# Patient Record
Sex: FEMALE | Race: WHITE | NOT HISPANIC OR LATINO | Employment: UNEMPLOYED | URBAN - METROPOLITAN AREA
[De-identification: names, ages, dates, MRNs, and addresses within clinical notes are randomized per-mention and may not be internally consistent; named-entity substitution may affect disease eponyms.]

---

## 2019-02-01 ENCOUNTER — TELEPHONE (OUTPATIENT)
Dept: OBSTETRICS AND GYNECOLOGY | Age: 34
End: 2019-02-01

## 2019-03-22 ENCOUNTER — TRANSCRIBE ORDERS (OUTPATIENT)
Dept: ADMINISTRATIVE | Facility: HOSPITAL | Age: 34
End: 2019-03-22

## 2019-03-22 DIAGNOSIS — N64.4 BREAST PAIN, LEFT: Primary | ICD-10-CM

## 2019-03-26 ENCOUNTER — HOSPITAL ENCOUNTER (OUTPATIENT)
Dept: MAMMOGRAPHY | Facility: HOSPITAL | Age: 34
Discharge: HOME | End: 2019-03-26
Admitting: FAMILY MEDICINE

## 2019-03-26 ENCOUNTER — HOSPITAL ENCOUNTER (OUTPATIENT)
Dept: ULTRASOUND IMAGING | Facility: HOSPITAL | Age: 34
Discharge: HOME | End: 2019-03-26

## 2019-03-26 DIAGNOSIS — N64.4 BREAST PAIN, LEFT: ICD-10-CM

## 2019-03-26 PROCEDURE — 77066 DX MAMMO INCL CAD BI: CPT

## 2019-03-26 PROCEDURE — 76642 ULTRASOUND BREAST LIMITED: CPT

## 2019-04-02 ENCOUNTER — TRANSCRIBE ORDERS (OUTPATIENT)
Dept: ADMINISTRATIVE | Facility: HOSPITAL | Age: 34
End: 2019-04-02

## 2019-04-08 RX ORDER — CETIRIZINE HYDROCHLORIDE 10 MG/1
10 TABLET ORAL DAILY
COMMUNITY
End: 2021-08-20

## 2019-04-10 ENCOUNTER — HOSPITAL ENCOUNTER (OUTPATIENT)
Dept: MAMMOGRAPHY | Facility: HOSPITAL | Age: 34
Discharge: HOME | End: 2019-04-10
Admitting: FAMILY MEDICINE

## 2019-04-10 VITALS
WEIGHT: 230 LBS | OXYGEN SATURATION: 98 % | SYSTOLIC BLOOD PRESSURE: 131 MMHG | HEIGHT: 67 IN | BODY MASS INDEX: 36.1 KG/M2 | HEART RATE: 73 BPM | RESPIRATION RATE: 16 BRPM | DIASTOLIC BLOOD PRESSURE: 92 MMHG | TEMPERATURE: 96.8 F

## 2019-04-10 DIAGNOSIS — R92.8 ABNORMAL MAMMOGRAM OF RIGHT BREAST: ICD-10-CM

## 2019-04-10 PROCEDURE — 88305 TISSUE EXAM BY PATHOLOGIST: CPT | Performed by: FAMILY MEDICINE

## 2019-04-10 RX ORDER — LOSARTAN POTASSIUM 100 MG/1
100 TABLET ORAL DAILY
COMMUNITY

## 2019-04-10 RX ORDER — LIDOCAINE HYDROCHLORIDE AND EPINEPHRINE 10; 10 MG/ML; UG/ML
20 INJECTION, SOLUTION INFILTRATION; PERINEURAL ONCE
Status: COMPLETED | OUTPATIENT
Start: 2019-04-10 | End: 2019-04-10

## 2019-04-10 RX ADMIN — LIDOCAINE HYDROCHLORIDE AND EPINEPHRINE 20 ML: 10; 10 INJECTION, SOLUTION INFILTRATION; PERINEURAL at 09:30

## 2019-04-10 NOTE — NURSING NOTE
Biopsy site to right medial breast clear with Dermabond dry and intact. No firmness or swelling noted at or around biopsy site. Denies pain. Ice pack with protective covering applied to biopsy site. Discharge instructions discussed with understanding voiced by patient. Copies provided to patient. No distress noted. To home via private vehicle accompanied by her mother.

## 2019-04-11 LAB
CYTO UR: NORMAL
LAB AP CASE REPORT: NORMAL
LAB AP CLINICAL INFORMATION: NORMAL
LAB AP DIAGNOSIS COMMENT: NORMAL
PATH REPORT.FINAL DX SPEC: NORMAL
PATH REPORT.GROSS SPEC: NORMAL

## 2021-08-20 ENCOUNTER — OFFICE VISIT (OUTPATIENT)
Dept: OBSTETRICS AND GYNECOLOGY | Facility: CLINIC | Age: 36
End: 2021-08-20

## 2021-08-20 VITALS
SYSTOLIC BLOOD PRESSURE: 118 MMHG | HEIGHT: 67 IN | DIASTOLIC BLOOD PRESSURE: 62 MMHG | WEIGHT: 227.8 LBS | BODY MASS INDEX: 35.75 KG/M2

## 2021-08-20 DIAGNOSIS — Z11.3 SCREENING FOR STD (SEXUALLY TRANSMITTED DISEASE): ICD-10-CM

## 2021-08-20 DIAGNOSIS — Z01.419 ENCOUNTER FOR GYNECOLOGICAL EXAMINATION WITHOUT ABNORMAL FINDING: Primary | ICD-10-CM

## 2021-08-20 PROCEDURE — 99385 PREV VISIT NEW AGE 18-39: CPT | Performed by: OBSTETRICS & GYNECOLOGY

## 2021-08-20 RX ORDER — LEVOCETIRIZINE DIHYDROCHLORIDE 5 MG/1
5 TABLET, FILM COATED ORAL DAILY
COMMUNITY
Start: 2021-08-03

## 2021-08-20 RX ORDER — MEDROXYPROGESTERONE ACETATE 10 MG/1
10 TABLET ORAL DAILY
Qty: 10 TABLET | Refills: 2 | Status: SHIPPED | OUTPATIENT
Start: 2021-08-20 | End: 2021-08-30

## 2021-08-20 NOTE — PROGRESS NOTES
GYN Annual Exam     CC- Here for annual exam.     Malgorzata Farmer is a 35 y.o. female who presents for annual well woman exam. Periods are Very infrequent and not unusual to go 6 months without menses., lasting 5 days. Dysmenorrhea:mild, occurring first 1-2 days of flow. Cyclic symptoms include none. No intermenstrual bleeding, spotting, or discharge.  She has a long history of chronic oligo ovulation and since that time she started having periods.  She has been on birth control pills in the past but has not been sexually active in quite some time.  She did just recently develop a relationship and wishes to discuss contraceptive options as well as STD screening.    OB History        0    Para   0    Term   0       0    AB   0    Living   0       SAB   0    TAB   0    Ectopic   0    Molar   0    Multiple   0    Live Births   0                Current contraception: none  History of abnormal Pap smear: no  Family history of uterine, colon or ovarian cancer: no  History of abnormal mammogram: no  Family history of breast cancer: Maternal breast cancer  Last Pap : Several years ago    Past Medical History:   Diagnosis Date   • HTN (hypertension)    • HTN (hypertension)    • Seasonal allergies        Past Surgical History:   Procedure Laterality Date   • LAPAROSCOPIC CHOLECYSTECTOMY           Current Outpatient Medications:   •  levocetirizine (XYZAL) 5 MG tablet, Take 5 mg by mouth Daily., Disp: , Rfl:   •  losartan (COZAAR) 100 MG tablet, Take 100 mg by mouth Daily., Disp: , Rfl:   •  medroxyPROGESTERone (Provera) 10 MG tablet, Take 1 tablet by mouth Daily for 10 days., Disp: 10 tablet, Rfl: 2    No Known Allergies    Social History     Tobacco Use   • Smoking status: Never Smoker   • Smokeless tobacco: Never Used   Vaping Use   • Vaping Use: Some days   • Substances: THC   • Devices: Pre-filled pod   Substance Use Topics   • Alcohol use: Yes     Comment: occ   • Drug use: Yes     Types: Marijuana  "      Family History   Problem Relation Age of Onset   • Breast cancer Maternal Grandmother        Review of Systems   Constitutional: Negative for fatigue and fever.   Respiratory: Negative for shortness of breath.    Genitourinary: Positive for menstrual problem. Negative for pelvic pressure.       /62   Ht 170.2 cm (67\")   Wt 103 kg (227 lb 12.8 oz)   LMP 07/30/2021 Comment: Irregular cycles  BMI 35.68 kg/m²     Physical Exam  Genitourinary:      Pelvic exam was performed with patient in the lithotomy position.      Vulva, inguinal canal, urethra, bladder, vagina and uterus normal.      Bladder is not tender.      Cervical polyp present.      No cervical discharge or bleeding.            Uterus is mobile.      No uterine mass detected.     Uterus is midaxial and regular.      No right or left adnexal mass present.      Right adnexa not tender.      Left adnexa not tender.   Neck:      Thyroid: No thyroid mass or thyromegaly.   Chest:      Breasts:         Right: No mass, nipple discharge, skin change or tenderness.         Left: No mass, nipple discharge, skin change or tenderness.   Abdominal:      General: Abdomen is flat.      Palpations: Abdomen is soft. There is no mass.      Tenderness: There is no abdominal tenderness.   Neurological:      Mental Status: She is alert.   Vitals reviewed.                Assessment     1) GYN annual well woman exam.   2) chronic oligo ovulation  3) desired contraception  4) endocervical polyp     Plan     1) Breast Health - Clinical breast exam yearly, Discussed American cancer society recommendations for breast cancer screening, and Self breast awareness monthly  2) Pap -done with high risk HPV  3) Smoking status-negative  4) Encouraged to be wary of information obtained via social media and internet based on source and search.  5) Follow up prn and one year.   6) lengthy discussion with the patient regarding her long history of chronic oligo ovulation.  She has 3 " other coexisting risk factors for development of endometrial hyperplasia and potentially worse going forward.  Those would be her weight, her nulliparity, and hypertension.  Discussed the importance of regular withdrawal bleeding in some fashion that could include either oral contraceptive use, cyclic progestin use or progestin releasing IUD.  After discussion, she would like to move toward IUD insertion to provide constant progestin exposure to the endometrium as well as provide contraception.  She will return for Kyleena insertion after a progestin withdrawal bleed is given.  7) with regard to the endocervical polyp, I discussed that we could possibly try to remove it at the time of her IUD insertion but that these are usually benign and would not absolutely have to be removed if she is not having any problems.  I warned her about postcoital and clitoral related bleeding.      Sam Taylor MD   8/20/2021  11:03 EDT

## 2021-08-23 LAB
A VAGINAE DNA VAG QL NAA+PROBE: NORMAL SCORE
BVAB2 DNA VAG QL NAA+PROBE: NORMAL SCORE
C ALBICANS DNA VAG QL NAA+PROBE: NEGATIVE
C GLABRATA DNA VAG QL NAA+PROBE: NEGATIVE
C TRACH DNA VAG QL NAA+PROBE: NEGATIVE
MEGA1 DNA VAG QL NAA+PROBE: NORMAL SCORE
N GONORRHOEA DNA VAG QL NAA+PROBE: NEGATIVE
T VAGINALIS DNA VAG QL NAA+PROBE: NEGATIVE

## 2021-08-24 ENCOUNTER — TELEPHONE (OUTPATIENT)
Dept: OBSTETRICS AND GYNECOLOGY | Facility: CLINIC | Age: 36
End: 2021-08-24

## 2021-08-24 LAB
CYTOLOGIST CVX/VAG CYTO: NORMAL
CYTOLOGY CVX/VAG DOC CYTO: NORMAL
CYTOLOGY CVX/VAG DOC THIN PREP: NORMAL
DX ICD CODE: NORMAL
HIV 1 & 2 AB SER-IMP: NORMAL
HPV I/H RISK 4 DNA CVX QL PROBE+SIG AMP: NEGATIVE
OTHER STN SPEC: NORMAL
STAT OF ADQ CVX/VAG CYTO-IMP: NORMAL

## 2021-08-24 NOTE — TELEPHONE ENCOUNTER
----- Message from Sam Taylor MD sent at 8/24/2021  9:48 AM EDT -----  Cervical and vaginal panels all negative.

## 2021-08-27 NOTE — TELEPHONE ENCOUNTER
Good Morning,   Patient calling to schedule kyleena insertion, she is on her period.  From your note, it looks like we are waiting on the device.  Please follow up when it comes in.  ThanksKarla

## 2021-08-30 ENCOUNTER — TELEPHONE (OUTPATIENT)
Dept: OBSTETRICS AND GYNECOLOGY | Facility: CLINIC | Age: 36
End: 2021-08-30

## 2021-08-31 NOTE — TELEPHONE ENCOUNTER
Left voicemail for patient to return call to office. Needs to be scheduled for Kyleena insertion. I have the device.

## 2023-07-24 ENCOUNTER — OFFICE VISIT (OUTPATIENT)
Dept: FAMILY MEDICINE CLINIC | Facility: CLINIC | Age: 38
End: 2023-07-24
Payer: COMMERCIAL

## 2023-07-24 VITALS
TEMPERATURE: 98.2 F | WEIGHT: 229 LBS | DIASTOLIC BLOOD PRESSURE: 73 MMHG | OXYGEN SATURATION: 96 % | SYSTOLIC BLOOD PRESSURE: 114 MMHG | BODY MASS INDEX: 35.94 KG/M2 | HEIGHT: 67 IN | RESPIRATION RATE: 16 BRPM | HEART RATE: 103 BPM

## 2023-07-24 DIAGNOSIS — Z20.2 POSSIBLE EXPOSURE TO STD: ICD-10-CM

## 2023-07-24 DIAGNOSIS — K64.1 GRADE II HEMORRHOIDS: ICD-10-CM

## 2023-07-24 DIAGNOSIS — F41.9 ANXIETY: ICD-10-CM

## 2023-07-24 DIAGNOSIS — R73.09 ELEVATED GLUCOSE: ICD-10-CM

## 2023-07-24 DIAGNOSIS — R41.840 IMPAIRED CONCENTRATION: ICD-10-CM

## 2023-07-24 DIAGNOSIS — I10 PRIMARY HYPERTENSION: Primary | ICD-10-CM

## 2023-07-24 PROCEDURE — 99204 OFFICE O/P NEW MOD 45 MIN: CPT | Performed by: NURSE PRACTITIONER

## 2023-07-24 RX ORDER — LOSARTAN POTASSIUM 100 MG/1
100 TABLET ORAL DAILY
Qty: 90 TABLET | Refills: 1 | Status: SHIPPED | OUTPATIENT
Start: 2023-07-24 | End: 2024-01-20

## 2023-07-24 RX ORDER — HYDROCORTISONE 25 MG/G
CREAM TOPICAL 2 TIMES DAILY
Qty: 28 G | Refills: 2 | Status: SHIPPED | OUTPATIENT
Start: 2023-07-24 | End: 2023-08-21

## 2023-07-24 NOTE — PROGRESS NOTES
Chief Complaint  Establish Care, hemroid, and Hypertension    Stiven Mcgarry presents to [facility] as a 37-year-old female to establish care and to follow-up with continued medical concerns, to refill medications, obtain labs.  Overall doing well    She has a history of alternating diarrhea and constipation.  She does have some large hemorrhoids that are painful.  She does use witch hazel and hemorrhoid cream over-the-counter that does help slightly with her complaint of.  She would like a referral to colorectal for further evaluation.  She has tried to increase her fluid intake and fiber in her diet.  No rectal bleeding at this time but she does have some pink/bright red streaks occasionally when her hemorrhoids are flared    She does have a history of hypertension.  She has been stable on Cozaar 100 mg p.o. daily.  She denies any increase or changes in headaches no blurred vision no dizziness no flushing no chest pain or pressure.  She does not regularly check her blood pressure at home but has no medication side effects.    She has had a history of elevated glucose.  She is watching her carb and sugar intake  She is not fasting today will return for labs.    She is sexually active and has had multiple partners.  She would like to have a STI panel ran with her labs.  She has no rash, lesion no discharge or other cold or flulike symptoms.  She has no history of STI  She does have a Mirena she would like to have a hCG ran with her labs.  He does not usually have a menstrual period.  She does have a GYN and does plan to follow-up as scheduled    She has had some impaired concentration and anxiety in the past.  She is not currently taking any medication for either.  She is not currently in counseling.  She would like to be referred for testing for ADHD.  She has not been diagnosed in the past or had any testing previously    PHQ-2 Depression Screening  Little interest or  "pleasure in doing things? 0-->not at all   Feeling down, depressed, or hopeless? 1-->several days   PHQ-2 Total Score 1         She has no other acute complaints of today    She denies any other significant personal or family medical history    The following portions of the patient's history were reviewed and updated as appropriate: allergies, current medications, past family history, past medical history, past social history, past surgical history, and problem list                    Review of Systems   Constitutional:  Negative for chills, fatigue and fever.   HENT:  Negative for congestion.    Eyes:  Negative for visual disturbance.   Respiratory:  Negative for cough, shortness of breath and wheezing.    Cardiovascular:  Negative for chest pain, palpitations and leg swelling.   Gastrointestinal:  Positive for constipation and rectal pain. Negative for abdominal distention, abdominal pain, anal bleeding, blood in stool, diarrhea, nausea and vomiting.   Neurological:  Negative for dizziness and light-headedness.   Psychiatric/Behavioral:  Positive for decreased concentration. Negative for self-injury, sleep disturbance and suicidal ideas. The patient is not nervous/anxious.       Objective   Vital Signs:   Vitals:    07/24/23 1419   BP: 114/73   Pulse: 103   Resp: 16   Temp: 98.2 °F (36.8 °C)   TempSrc: Skin   SpO2: 96%   Weight: 104 kg (229 lb)   Height: 170.2 cm (67\")        Class 2 Severe Obesity (BMI >=35 and <=39.9). Obesity-related health conditions include the following: hypertension. Obesity is unchanged. BMI is is above average; BMI management plan is completed. We discussed portion control and increasing exercise.        Physical Exam  Vitals reviewed.   Constitutional:       General: She is not in acute distress.  Eyes:      Conjunctiva/sclera: Conjunctivae normal.   Neck:      Thyroid: No thyromegaly.      Vascular: No carotid bruit.   Cardiovascular:      Rate and Rhythm: Normal rate and regular " rhythm.      Heart sounds: Normal heart sounds.   Pulmonary:      Effort: Pulmonary effort is normal. No respiratory distress.      Breath sounds: Normal breath sounds. No stridor. No wheezing, rhonchi or rales.   Chest:      Chest wall: No tenderness.   Lymphadenopathy:      Cervical: No cervical adenopathy.   Neurological:      Mental Status: She is alert.   Psychiatric:         Attention and Perception: Attention normal.         Mood and Affect: Mood normal.        Result Review :     The following data was reviewed by: LUCA Bazan on 07/24/2023:     No recent labs to review     Assessment and Plan    Diagnoses and all orders for this visit:    1. Primary hypertension (Primary)  Comments:  Controlled continue current management  Monitor BP at home bring log to next visit  Orders:  -     Comprehensive Metabolic Panel  -     CBC & Differential  -     Lipid Panel  -     TSH  -     T4, Free  -     Vitamin D,25-Hydroxy  -     Hemoglobin A1c  -     losartan (COZAAR) 100 MG tablet; Take 1 tablet by mouth Daily for 180 days.  Dispense: 90 tablet; Refill: 1    2. Anxiety  Comments:  Controlled not on any medication at this time  Denies any SI or HI  Orders:  -     Comprehensive Metabolic Panel  -     CBC & Differential  -     Lipid Panel  -     TSH  -     T4, Free  -     Vitamin D,25-Hydroxy    3. Grade II hemorrhoids  Comments:  Worsening  Trial Anusol internal/external  Increase fluid and fiber intake  Orders:  -     Hydrocortisone, Perianal, (Anusol-HC) 2.5 % rectal cream; Insert  into the rectum 2 (Two) Times a Day for 28 days.  Dispense: 28 g; Refill: 2  -     Ambulatory Referral to Colorectal Surgery    4. Elevated glucose  Comments:  Recheck hemoglobin A1c with labs  Continue to watch carb and sugar intake  Orders:  -     Comprehensive Metabolic Panel  -     Hemoglobin A1c    5. Impaired concentration  Comments:  Referral for ADHD testing per psychiatry  Orders:  -     Comprehensive Metabolic Panel  -      CBC & Differential  -     Lipid Panel  -     TSH  -     T4, Free  -     Vitamin D,25-Hydroxy  -     Hemoglobin A1c  -     Ambulatory Referral to Psychiatry    6. Possible exposure to STD  Comments:  Labs ordered  Recommend barrier method until negative test results or all partners have been treated  Orders:  -     Hepatitis panel, acute  -     RPR  -     Chlamydia trachomatis, Neisseria gonorrhoeae, Trichomonas vaginalis, PCR - Urine, Urine, Clean Catch  -     Hepatitis C RNA, quantitative, PCR (graph)  -     HIV-1/O/2 ANTIGEN/ANTIBODY, 4TH GENERATION  -     HSV 1 and 2-Specific Ab, IgG  -     hCG, Serum, Qualitative        Follow Up   Return in about 6 months (around 1/24/2024) for Annual physical.  Patient was given instructions and counseling regarding her condition or for health maintenance advice. Please see specific information pulled into the AVS if appropriate.

## 2023-08-09 PROBLEM — E66.811 OBESITY (BMI 30.0-34.9): Status: ACTIVE | Noted: 2019-10-07

## 2023-08-09 PROBLEM — E66.9 OBESITY (BMI 30.0-34.9): Status: ACTIVE | Noted: 2019-10-07

## 2023-08-09 PROBLEM — K80.01 CHOLELITHIASIS AND ACUTE CHOLECYSTITIS WITH OBSTRUCTION: Status: ACTIVE | Noted: 2019-10-07

## 2023-09-13 NOTE — TELEPHONE ENCOUNTER
Left voicemail for patient to return call to office.     Inform all vaginal panels are negative.    Winlevi Pregnancy And Lactation Text: This medication is considered safe during pregnancy and breastfeeding.

## 2024-02-06 DIAGNOSIS — I10 PRIMARY HYPERTENSION: ICD-10-CM

## 2024-02-06 RX ORDER — LOSARTAN POTASSIUM 100 MG/1
100 TABLET ORAL DAILY
Qty: 30 TABLET | Refills: 0 | Status: SHIPPED | OUTPATIENT
Start: 2024-02-06 | End: 2024-02-06

## 2024-02-06 RX ORDER — LOSARTAN POTASSIUM 100 MG/1
100 TABLET ORAL DAILY
Qty: 90 TABLET | Refills: 0 | Status: SHIPPED | OUTPATIENT
Start: 2024-02-06

## 2024-04-10 ENCOUNTER — TELEPHONE (OUTPATIENT)
Dept: FAMILY MEDICINE CLINIC | Facility: CLINIC | Age: 39
End: 2024-04-10
Payer: COMMERCIAL

## 2024-05-06 DIAGNOSIS — I10 PRIMARY HYPERTENSION: ICD-10-CM

## 2024-05-09 RX ORDER — LOSARTAN POTASSIUM 100 MG/1
100 TABLET ORAL DAILY
Qty: 30 TABLET | Refills: 0 | Status: SHIPPED | OUTPATIENT
Start: 2024-05-09

## 2024-07-23 DIAGNOSIS — I10 PRIMARY HYPERTENSION: ICD-10-CM

## 2024-07-23 NOTE — TELEPHONE ENCOUNTER
Caller: Malgorzata Farmer    Relationship: Self    Best call back number: [phone]     What was the call regarding: PATIENT IS OUT OF THIS MEDICATION.

## 2024-07-24 ENCOUNTER — TELEPHONE (OUTPATIENT)
Dept: FAMILY MEDICINE CLINIC | Facility: CLINIC | Age: 39
End: 2024-07-24
Payer: COMMERCIAL

## 2024-07-24 RX ORDER — LOSARTAN POTASSIUM 100 MG/1
100 TABLET ORAL DAILY
Qty: 14 TABLET | Refills: 0 | Status: SHIPPED | OUTPATIENT
Start: 2024-07-24

## 2024-07-24 NOTE — TELEPHONE ENCOUNTER
Called and left vm for pt to call and make an appt. For further refills. It has been a year since her last visit.

## 2024-07-24 NOTE — TELEPHONE ENCOUNTER
Patient called back and said that she can't come in for a visit due to not having a job and no health insurance. She states she is unaware that she has to be seen every six months.    I talked with patient and let her know that she is over due.Patient wasn't happy that she has to be seen and ended the call.

## 2024-07-24 NOTE — TELEPHONE ENCOUNTER
Caller: Malgorzata Farmer    Relationship: Self    Best call back number: [phone]       What was the call regarding PATIENT HAS BEEN OUT OF THIS MEDICATION FOR A COUPLE OF DAYS AND CALLED BACK ASKING WHY IT HASN'T BEEN REFILLED.  SHE REALLY NEEDS IT.  PLEASE CALL TO DISCUSS OR SEND MEDICATION TO PHARMACY.      [id number] - [place], [place] - [address] - [phone]  - [phone] FX

## 2024-07-30 ENCOUNTER — OFFICE VISIT (OUTPATIENT)
Dept: FAMILY MEDICINE CLINIC | Facility: CLINIC | Age: 39
End: 2024-07-30
Payer: COMMERCIAL

## 2024-07-30 VITALS
DIASTOLIC BLOOD PRESSURE: 82 MMHG | BODY MASS INDEX: 35.41 KG/M2 | SYSTOLIC BLOOD PRESSURE: 110 MMHG | HEIGHT: 67 IN | OXYGEN SATURATION: 98 % | TEMPERATURE: 98.1 F | WEIGHT: 225.6 LBS | HEART RATE: 63 BPM

## 2024-07-30 DIAGNOSIS — F41.8 ANXIETY ASSOCIATED WITH DEPRESSION: ICD-10-CM

## 2024-07-30 DIAGNOSIS — I10 PRIMARY HYPERTENSION: Primary | ICD-10-CM

## 2024-07-30 PROCEDURE — 99214 OFFICE O/P EST MOD 30 MIN: CPT

## 2024-07-30 PROCEDURE — 1160F RVW MEDS BY RX/DR IN RCRD: CPT

## 2024-07-30 PROCEDURE — 1126F AMNT PAIN NOTED NONE PRSNT: CPT

## 2024-07-30 PROCEDURE — 1159F MED LIST DOCD IN RCRD: CPT

## 2024-07-30 RX ORDER — LOSARTAN POTASSIUM 100 MG/1
100 TABLET ORAL DAILY
Qty: 90 TABLET | Refills: 1 | Status: SHIPPED | OUTPATIENT
Start: 2024-07-30

## 2024-07-30 RX ORDER — ESCITALOPRAM OXALATE 10 MG/1
10 TABLET ORAL DAILY
Qty: 60 TABLET | Refills: 0 | Status: SHIPPED | OUTPATIENT
Start: 2024-07-30

## 2024-07-30 NOTE — PROGRESS NOTES
Chief Complaint  Hypertension, Med Refill (losartan), and Depression    Subjective          Hypertension  Pertinent negatives include no chest pain, palpitations or shortness of breath.   DepressionSymptoms include nervousness/anxiety. Patient is not experiencing: palpitations, shortness of breath, suicidal ideas, chest pain, dizziness and nausea.  Her past medical history is significant for depression.       Malgorzata Farmer 38 y.o. female presents for medical management. Since the last visit, she has overall felt depressed. She has Primary Hypertension and well controlled on current medication. She has been compliant with current medications, and I have reviewed them. The patient denies medication side effects. Will refill medications.     Malgorzata Farmer also presents today for new evaluation and treatment of Depression and Anxiety. She reports depressed mood, crying spells, difficulty falling asleep, anxiety, excessive worry, and unable to relax. Onset of symptoms was approximately 23  years ago. She denies current suicidal and homicidal ideation. Risk factors are history of depression and feelings of failure. Previous treatment includes medication (SSRI). She complains of the following medication side effects: none. The patient has previously been in counseling and agrees to start counseling.    PHQ-9 score: 17    After a discussion with the patient today, she would like to start Escitalopram. She is not currently pregnant. She was instructed to inform OBGYN that she is taking this medication should she become pregnant. She agrees to a close follow-up in 6 weeks.         Review of Systems   Constitutional:  Negative for chills, fatigue and fever.   HENT:  Negative for congestion and sinus pressure.    Eyes:  Negative for visual disturbance.   Respiratory:  Negative for cough, chest tightness and shortness of breath.    Cardiovascular:  Negative for chest pain and palpitations.   Gastrointestinal:  Negative for  "abdominal pain, constipation, diarrhea, nausea and vomiting.   Genitourinary:  Negative for dysuria, frequency and urgency.   Musculoskeletal:  Negative for back pain.   Skin:  Negative for rash.   Neurological:  Negative for dizziness, syncope and light-headedness.   Psychiatric/Behavioral:  Positive for dysphoric mood. Negative for behavioral problems, self-injury, sleep disturbance and suicidal ideas. The patient is nervous/anxious.         Objective   Vital Signs:   /82 (BP Location: Left arm, Patient Position: Sitting, Cuff Size: Adult)   Pulse 63   Temp 98.1 °F (36.7 °C) (Oral)   Ht 170.2 cm (67\")   Wt 102 kg (225 lb 9.6 oz)   SpO2 98%   BMI 35.33 kg/m²      Class 2 Severe Obesity (BMI >=35 and <=39.9). Obesity-related health conditions include the following: none. Obesity is newly identified. BMI is is above average; BMI management plan is completed. We discussed portion control and increasing exercise.       Physical Exam  Vitals and nursing note reviewed.   Constitutional:       Appearance: She is well-developed. She is obese. She is not toxic-appearing or diaphoretic.   HENT:      Head: Normocephalic and atraumatic.   Eyes:      General: No scleral icterus.     Conjunctiva/sclera: Conjunctivae normal.      Pupils: Pupils are equal, round, and reactive to light.   Neck:      Thyroid: No thyromegaly.      Vascular: No carotid bruit.   Cardiovascular:      Rate and Rhythm: Normal rate and regular rhythm.      Heart sounds: Normal heart sounds. No murmur heard.     No gallop.   Pulmonary:      Effort: Pulmonary effort is normal. No respiratory distress.      Breath sounds: Normal breath sounds. No stridor.   Musculoskeletal:         General: No deformity. Normal range of motion.      Cervical back: Normal range of motion and neck supple.   Skin:     General: Skin is warm and dry.      Coloration: Skin is not jaundiced.   Neurological:      General: No focal deficit present.      Mental Status: She " is alert and oriented to person, place, and time.      Motor: No abnormal muscle tone.      Coordination: Coordination normal.   Psychiatric:         Mood and Affect: Mood is depressed. Mood is not anxious. Affect is tearful.         Behavior: Behavior normal.         Thought Content: Thought content normal. Thought content does not include homicidal or suicidal ideation. Thought content does not include homicidal or suicidal plan.         Judgment: Judgment normal.                         Assessment and Plan      Assessment & Plan  Primary hypertension  Hypertension is stable and controlled  Continue current treatment regimen.  Dietary sodium restriction.  Weight loss.  Regular aerobic exercise.  Blood pressure will be reassessed in 6 months.  Anxiety associated with depression  Patient's depression is a recurrent episode that is moderate without psychosis. Depression is active and newly identified.  No SI or HI    Plan:   Begin new antidepressant medicine; Escitalopram 10 mg once daily    Follow up in 6 weeks .      New Medications Ordered This Visit   Medications    losartan (COZAAR) 100 MG tablet     Sig: Take 1 tablet by mouth Daily.     Dispense:  90 tablet     Refill:  1    escitalopram (Lexapro) 10 MG tablet     Sig: Take 1 tablet by mouth Daily.     Dispense:  60 tablet     Refill:  0              Follow Up     Return in about 6 weeks (around 9/10/2024) for Video visit.    Patient was given instructions and counseling regarding her condition or for health maintenance advice. Please see specific information pulled into the AVS if appropriate.     -Bonds for safety were provided today. The patient was encouraged to seek immediate mental health evaluation for worsening depression, suicidal thoughts, suicidal plan, or thoughts of self-harm.   -The patient was given a list of local therapists today.

## 2024-09-10 ENCOUNTER — TELEMEDICINE (OUTPATIENT)
Dept: FAMILY MEDICINE CLINIC | Facility: CLINIC | Age: 39
End: 2024-09-10
Payer: COMMERCIAL

## 2024-09-10 VITALS — WEIGHT: 225 LBS | BODY MASS INDEX: 35.31 KG/M2 | HEIGHT: 67 IN

## 2024-09-10 DIAGNOSIS — J30.2 SEASONAL ALLERGIES: ICD-10-CM

## 2024-09-10 DIAGNOSIS — F41.8 ANXIETY ASSOCIATED WITH DEPRESSION: Primary | ICD-10-CM

## 2024-09-10 DIAGNOSIS — I10 PRIMARY HYPERTENSION: ICD-10-CM

## 2024-09-10 PROCEDURE — 99213 OFFICE O/P EST LOW 20 MIN: CPT | Performed by: NURSE PRACTITIONER

## 2024-09-10 PROCEDURE — 1126F AMNT PAIN NOTED NONE PRSNT: CPT | Performed by: NURSE PRACTITIONER

## 2024-09-10 RX ORDER — LEVOCETIRIZINE DIHYDROCHLORIDE 5 MG/1
5 TABLET, FILM COATED ORAL DAILY
Qty: 90 TABLET | Refills: 3 | Status: SHIPPED | OUTPATIENT
Start: 2024-09-10

## 2024-09-10 RX ORDER — ESCITALOPRAM OXALATE 20 MG/1
20 TABLET ORAL DAILY
Qty: 90 TABLET | Refills: 1 | Status: SHIPPED | OUTPATIENT
Start: 2024-09-10

## 2024-09-10 NOTE — PROGRESS NOTES
Chief Complaint  Anxiety and Hypertension    Subjective          Mode of communication: Video   You have chosen to receive care through a telehealth visit.  Do you consent to use a video/audio connection for your medical care today? Yes  Location of patient: home  Location of provider: [facility] [address] [place] [address]  The visit included audio and video interaction.  No technical issues occurred during this visit.      Anxiety   Patient reports no chest pain, dizziness, nausea, nervous/anxious behavior, palpitations, shortness of breath or suicidal ideas.   Hypertension  Associated symptoms include anxiety. Pertinent negatives include no chest pain, headaches, palpitations or shortness of breath.      Malgorzata presents to [facility] as a 38-year-old female for video telehealth visit to follow-up on anxiety and depression and hypertension.  Overall doing well    She was seen in the office by Kathleen lebron approximately 6 weeks ago  Note from that visit  Hypertension  Pertinent negatives include no chest pain, palpitations or shortness of breath.   DepressionSymptoms include nervousness/anxiety. Patient is not experiencing: palpitations, shortness of breath, suicidal ideas, chest pain, dizziness and nausea.  Her past medical history is significant for depression.         Malgorzata Farmer 38 y.o. female presents for medical management. Since the last visit, she has overall felt depressed. She has Primary Hypertension and well controlled on current medication. She has been compliant with current medications, and I have reviewed them. The patient denies medication side effects. Will refill medications.      Malgorzata Farmer also presents today for new evaluation and treatment of Depression and Anxiety. She reports depressed mood, crying spells, difficulty falling asleep, anxiety, excessive worry, and unable to relax. Onset of symptoms was approximately 23  years ago. She denies current suicidal  and homicidal ideation. Risk factors are history of depression and feelings of failure. Previous treatment includes medication (SSRI). She complains of the following medication side effects: none. The patient has previously been in counseling and agrees to start counseling.     PHQ-9 score: 17     After a discussion with the patient today, she would like to start Escitalopram. She is not currently pregnant. She was instructed to inform OBGYN that she is taking this medication should she become pregnant. She agrees to a close follow-up in 6 weeks.         Overall she has been doing a little bit better since last visit.  She does feel that she still having some anxiety and excessive worry and feeling down occasionally but it has been improved.  She would like to try increasing her Lexapro.  She has tolerated this medication well with no side effects.  She has previously been in counseling and does plan to follow-up      She does occasionally check her blood pressure at home.  It has been stable with no changes.  No increase or changes in headaches no blurred vision no dizziness no flushing no chest pain or pressure    Seasonal allergies-takes Xyzal works well    She does need refills on her medication today    No other acute complaints      The following portions of the patient's history were reviewed and updated as appropriate: allergies, current medications, past family history, past medical history, past social history, past surgical history, and problem list    Review of Systems   Constitutional:  Negative for chills, fatigue and fever.   Eyes:  Negative for visual disturbance.   Respiratory:  Negative for cough, shortness of breath, wheezing and stridor.    Cardiovascular:  Negative for chest pain, palpitations and leg swelling.   Gastrointestinal:  Negative for abdominal pain, diarrhea, nausea and vomiting.   Skin:  Negative for rash.   Neurological:  Negative for dizziness.   Psychiatric/Behavioral:  Negative  "for self-injury, sleep disturbance and suicidal ideas. The patient is not nervous/anxious.         Objective   Vital Signs: unable to assess  No temp per pt.      B/p reports B/p normal 110-120/70s  Vitals:    09/10/24 1641   Weight: 102 kg (225 lb)   Height: 170.2 cm (67\")            7/30/2024    10:30 AM   PHQ-2/PHQ-9 Depression Screening   Little Interest or Pleasure in Doing Things 1-->several days   Feeling Down, Depressed or Hopeless 3-->nearly every day   Trouble Falling or Staying Asleep, or Sleeping Too Much 3-->nearly every day   Feeling Tired or Having Little Energy 3-->nearly every day   Poor Appetite or Overeating 1-->several days   Feeling Bad about Yourself - or that You are a Failure or Have Let Yourself or Your Family Down 3-->nearly every day   Trouble Concentrating on Things, Such as Reading the Newspaper or Watching Television 3-->nearly every day   Thoughts that You Would be Better Off Dead or of Hurting Yourself in Some Way 0-->not at all   PHQ-9: Brief Depression Severity Measure Score 17   If You Checked Off Any Problems, How Difficult Have These Problems Made It For You to Do Your Work, Take Care of Things at Home, or Get Along with Other People? extremely difficult               Virtual-  limited assessment  Physical Exam  Constitutional:       General: She is not in acute distress.  Neck:      Thyroid: No thyromegaly.   Pulmonary:      Effort: Pulmonary effort is normal. No respiratory distress.   Neurological:      Mental Status: She is alert and oriented to person, place, and time.   Psychiatric:         Attention and Perception: Attention normal.         Mood and Affect: Mood normal.          Result Review :     The following data was reviewed by: LUCA Bazan on 09/10/2024:           Assessment and Plan    Assessment & Plan  Anxiety associated with depression  Slightly improved with lexapro  Increase dose  Report any medication side effects immediately  To ER with any " SI/HI    Primary hypertension  Hypertension is stable and controlled  Continue current treatment regimen.  Blood pressure will be reassessed in 6 months.  Seasonal allergies  Refill Xyzall-  working well             This was an audio and video enabled telemedicine encounter.  Video Visit lasted approx 12 min  Follow Up   No follow-ups on file.  Patient was given instructions and counseling regarding her condition or for health maintenance advice. Please see specific information pulled into the AVS if appropriate.

## 2025-02-26 DIAGNOSIS — I10 PRIMARY HYPERTENSION: ICD-10-CM

## 2025-02-27 RX ORDER — LOSARTAN POTASSIUM 100 MG/1
100 TABLET ORAL DAILY
Qty: 90 TABLET | Refills: 1 | Status: SHIPPED | OUTPATIENT
Start: 2025-02-27

## 2025-05-27 DIAGNOSIS — F41.8 ANXIETY ASSOCIATED WITH DEPRESSION: ICD-10-CM

## 2025-05-27 RX ORDER — ESCITALOPRAM OXALATE 20 MG/1
20 TABLET ORAL DAILY
Qty: 90 TABLET | Refills: 1 | Status: SHIPPED | OUTPATIENT
Start: 2025-05-27

## 2025-05-27 NOTE — TELEPHONE ENCOUNTER
Rx Refill Note  Requested Prescriptions     Pending Prescriptions Disp Refills    escitalopram (LEXAPRO) 20 MG tablet [Pharmacy Med Name: ESCITALOPRAM 20MG TABLETS] 90 tablet 1     Sig: TAKE 1 TABLET BY MOUTH DAILY      Last OV with Kathleen 7/30/24  Saw [facility] on 9/10/24      AMANDEEP Sanchez(R)  05/27/25, 11:28 EDT